# Patient Record
Sex: FEMALE | Race: WHITE
[De-identification: names, ages, dates, MRNs, and addresses within clinical notes are randomized per-mention and may not be internally consistent; named-entity substitution may affect disease eponyms.]

---

## 2024-02-01 ENCOUNTER — TELEPHONE (OUTPATIENT)
Dept: INTERNAL MEDICINE CLINIC | Facility: CLINIC | Age: 64
End: 2024-02-01

## 2024-02-01 NOTE — TELEPHONE ENCOUNTER
Patient calling states she is needing the date of her mammogram and the notes to give to her new Doctor, made patient aware to complete a JEAN with her new Doctor patient understood but still asked if she could be provided the date.

## 2024-02-01 NOTE — TELEPHONE ENCOUNTER
Pt notified it has been over 10 years and no records showing in this system. Pt will fill out JEAN and have it faxed to medical records.